# Patient Record
Sex: FEMALE | URBAN - METROPOLITAN AREA
[De-identification: names, ages, dates, MRNs, and addresses within clinical notes are randomized per-mention and may not be internally consistent; named-entity substitution may affect disease eponyms.]

---

## 2021-08-25 ENCOUNTER — APPOINTMENT (OUTPATIENT)
Dept: URBAN - METROPOLITAN AREA CLINIC 319 | Age: 61
Setting detail: DERMATOLOGY
End: 2021-08-25

## 2021-08-25 DIAGNOSIS — L82.0 INFLAMED SEBORRHEIC KERATOSIS: ICD-10-CM

## 2021-08-25 DIAGNOSIS — D22 MELANOCYTIC NEVI: ICD-10-CM

## 2021-08-25 PROBLEM — D22.5 MELANOCYTIC NEVI OF TRUNK: Status: ACTIVE | Noted: 2021-08-25

## 2021-08-25 PROBLEM — D22.39 MELANOCYTIC NEVI OF OTHER PARTS OF FACE: Status: ACTIVE | Noted: 2021-08-25

## 2021-08-25 PROCEDURE — 99203 OFFICE O/P NEW LOW 30 MIN: CPT | Mod: 25

## 2021-08-25 PROCEDURE — OTHER SHAVE REMOVAL (NO PATHOLOGY): OTHER

## 2021-08-25 PROCEDURE — OTHER TREATMENT REGIMEN: OTHER

## 2021-08-25 PROCEDURE — OTHER COUNSELING: OTHER

## 2021-08-25 PROCEDURE — 11300 SHAVE SKIN LESION 0.5 CM/<: CPT

## 2021-08-25 PROCEDURE — OTHER MIPS QUALITY: OTHER

## 2021-08-25 PROCEDURE — OTHER PHOTO-DOCUMENTATION: OTHER

## 2021-08-25 ASSESSMENT — LOCATION ZONE DERM
LOCATION ZONE: TRUNK
LOCATION ZONE: LEG
LOCATION ZONE: FACE

## 2021-08-25 ASSESSMENT — LOCATION DETAILED DESCRIPTION DERM
LOCATION DETAILED: EPIGASTRIC SKIN
LOCATION DETAILED: LEFT ANTERIOR PROXIMAL THIGH
LOCATION DETAILED: RIGHT ANTERIOR DISTAL THIGH
LOCATION DETAILED: LEFT INFERIOR MEDIAL MALAR CHEEK

## 2021-08-25 ASSESSMENT — LOCATION SIMPLE DESCRIPTION DERM
LOCATION SIMPLE: LEFT CHEEK
LOCATION SIMPLE: LEFT THIGH
LOCATION SIMPLE: RIGHT THIGH
LOCATION SIMPLE: ABDOMEN

## 2021-08-25 NOTE — PROCEDURE: TREATMENT REGIMEN
Detail Level: Zone
Plan: Treated with LN2 today
Plan: 2 nevi located on upper lip below nose for future excision cosmetic Dr. Childers.

## 2021-08-25 NOTE — PROCEDURE: SHAVE REMOVAL (NO PATHOLOGY)
Include Z78.9 (Other Specified Conditions Influencing Health Status) As An Associated Diagnosis?: No
Detail Level: Detailed
Render Post-Care Instructions In Note?: yes
Wound Care: Petrolatum
Consent was obtained from the patient. The risks and benefits to therapy were discussed in detail. Specifically, the risks of infection, scarring, bleeding, prolonged wound healing, incomplete removal, allergy to anesthesia, nerve injury and recurrence were addressed. Prior to the procedure, the treatment site was clearly identified and confirmed by the patient. All components of Universal Protocol/PAUSE Rule completed.
Hemostasis: hCarisse's
Size Of Lesion In Cm: 0.5
X Size Of Lesion In Cm (Optional): 0
Medical Necessity Information: It is in your best interest to select a reason for this procedure from the list below. All of these items fulfill various CMS LCD requirements except the new and changing color options.
Anesthesia Volume In Cc: 1
Anesthesia Type: 1% lidocaine without epinephrine
Medical Necessity Clause: This procedure was medically necessary because the lesion that was treated was:
Post-Care Instructions: I reviewed with the patient in detail post-care instructions. Patient is to keep the biopsy site dry overnight, and then apply bacitracin twice daily until healed. Patient may apply hydrogen peroxide soaks to remove any crusting.

## 2021-10-13 ENCOUNTER — APPOINTMENT (OUTPATIENT)
Dept: URBAN - METROPOLITAN AREA CLINIC 319 | Age: 61
Setting detail: DERMATOLOGY
End: 2021-10-13

## 2021-10-13 DIAGNOSIS — D22 MELANOCYTIC NEVI: ICD-10-CM

## 2021-10-13 PROBLEM — D22.0 MELANOCYTIC NEVI OF LIP: Status: ACTIVE | Noted: 2021-10-13

## 2021-10-13 PROCEDURE — OTHER COSMETIC EXCISION: OTHER

## 2021-10-13 ASSESSMENT — LOCATION ZONE DERM: LOCATION ZONE: LIP

## 2021-10-13 ASSESSMENT — LOCATION DETAILED DESCRIPTION DERM
LOCATION DETAILED: RIGHT UPPER CUTANEOUS LIP
LOCATION DETAILED: LEFT UPPER CUTANEOUS LIP

## 2021-10-13 ASSESSMENT — LOCATION SIMPLE DESCRIPTION DERM
LOCATION SIMPLE: RIGHT LIP
LOCATION SIMPLE: LEFT LIP

## 2021-10-13 NOTE — PROCEDURE: COSMETIC EXCISION
Wound Care: Petrolatum
Path Notes (To The Dermatopathologist): Nevus
Epidermal Sutures: 6-0 Ethilon
Medical Necessity Clause: This procedure was medically necessary because the lesion that was treated was:
Excision Depth: adipose tissue
Epidermal Closure: simple interrupted
Scalpel Size: 15 blade
Consent was obtained from the patient. The risks and benefits to therapy were discussed in detail. Specifically, the risks of infection, scarring, bleeding, prolonged wound healing, incomplete removal, allergy to anesthesia, nerve injury and recurrence were addressed. Prior to the procedure, the treatment site was clearly identified and confirmed by the patient. All components of Universal Protocol/PAUSE Rule completed.
Billing Type: Patient Bill
Anesthesia Volume In Cc: 3
Deep Sutures: 6-0 Biosyn
Detail Level: Detailed
Anesthesia Volume In Cc: 2
Excision Method: Fusiform
Hemostasis: Electrocautery
Price (Use Numbers Only, No Special Characters Or $): 250.00
Post-Care Instructions: I reviewed with the patient in detail post-care instructions. Patient is not to engage in any heavy lifting, exercise, or swimming for the next 14 days. Should the patient develop any fevers, chills, bleeding, severe pain patient will contact the office immediately.
Render Path Notes In Note?: No
Anesthesia Volume In Cc: 0
Repair Type: None
Intermediate / Complex Repair - Final Wound Length In Cm: 1.3
Dressing: pressure dressing with telfa
Price (Use Numbers Only, No Special Characters Or $): 500.00
Additional Anesthesia Volume In Cc: 6
Anesthesia Type: 1% lidocaine with epinephrine and 0.5% Marcaine
Size Of Lesion In Cm: 0.3
Size Of Margin In Cm: 0.1
Estimated Blood Loss (Cc): minimal
Size Of Lesion In Cm: 0.5
Billing Type: Patient Bill
Intermediate / Complex Repair - Final Wound Length In Cm: 1.9

## 2021-10-19 ENCOUNTER — APPOINTMENT (OUTPATIENT)
Dept: URBAN - METROPOLITAN AREA CLINIC 319 | Age: 61
Setting detail: DERMATOLOGY
End: 2021-10-19

## 2021-10-19 DIAGNOSIS — Z48.02 ENCOUNTER FOR REMOVAL OF SUTURES: ICD-10-CM

## 2021-10-19 PROCEDURE — OTHER SUTURE REMOVAL (GLOBAL PERIOD): OTHER

## 2021-10-19 ASSESSMENT — LOCATION SIMPLE DESCRIPTION DERM
LOCATION SIMPLE: RIGHT LIP
LOCATION SIMPLE: LEFT LIP

## 2021-10-19 ASSESSMENT — LOCATION ZONE DERM: LOCATION ZONE: LIP

## 2021-10-19 ASSESSMENT — LOCATION DETAILED DESCRIPTION DERM
LOCATION DETAILED: RIGHT UPPER CUTANEOUS LIP
LOCATION DETAILED: LEFT UPPER CUTANEOUS LIP
